# Patient Record
Sex: MALE | Race: BLACK OR AFRICAN AMERICAN | NOT HISPANIC OR LATINO | URBAN - METROPOLITAN AREA
[De-identification: names, ages, dates, MRNs, and addresses within clinical notes are randomized per-mention and may not be internally consistent; named-entity substitution may affect disease eponyms.]

---

## 2024-05-29 ENCOUNTER — NURSING HOME VISIT (OUTPATIENT)
Dept: POST ACUTE CARE | Facility: EXTERNAL LOCATION | Age: 84
End: 2024-05-29
Payer: MEDICARE

## 2024-05-29 DIAGNOSIS — J44.9 CHRONIC OBSTRUCTIVE PULMONARY DISEASE, UNSPECIFIED COPD TYPE (MULTI): ICD-10-CM

## 2024-05-29 DIAGNOSIS — A41.9 SEPSIS, DUE TO UNSPECIFIED ORGANISM, UNSPECIFIED WHETHER ACUTE ORGAN DYSFUNCTION PRESENT (MULTI): Primary | ICD-10-CM

## 2024-05-29 DIAGNOSIS — I11.0 HYPERTENSIVE HEART DISEASE WITH CONGESTIVE HEART FAILURE, UNSPECIFIED HEART FAILURE TYPE (MULTI): ICD-10-CM

## 2024-05-29 DIAGNOSIS — R33.9 URINARY RETENTION: ICD-10-CM

## 2024-05-29 DIAGNOSIS — I48.91 ATRIAL FIBRILLATION, UNSPECIFIED TYPE (MULTI): ICD-10-CM

## 2024-05-29 PROBLEM — N40.0 BPH (BENIGN PROSTATIC HYPERPLASIA): Status: RESOLVED | Noted: 2024-05-29 | Resolved: 2024-05-29

## 2024-05-29 PROBLEM — K21.9 GERD (GASTROESOPHAGEAL REFLUX DISEASE): Status: ACTIVE | Noted: 2024-05-29

## 2024-05-29 PROBLEM — E11.9 TYPE 2 DIABETES MELLITUS (MULTI): Status: ACTIVE | Noted: 2024-05-29

## 2024-05-29 PROBLEM — N40.0 BPH (BENIGN PROSTATIC HYPERPLASIA): Status: ACTIVE | Noted: 2024-05-29

## 2024-05-29 PROCEDURE — 99305 1ST NF CARE MODERATE MDM 35: CPT | Performed by: INTERNAL MEDICINE

## 2024-05-29 NOTE — LETTER
Patient: Kael Barber  : 1940    Encounter Date: 2024    HISTORY & PHYSICAL    Subjective  Chief complaint: Kael Barber is a 84 y.o. male who is being seen and evaluated for multiple medical problems.  Patient admitted to SNF for therapy due to weakness after recent hospitalization.    HPI:  HPI  Patient was admitted to hospital for positive UTI, consistent with sepsis.  Patient was started on antibiotics.  Patient had recent hospitalization for A-fib flutter, underwent cardioversion which was unsuccessful and ultimately underwent AV josie ablation, pacemaker placement.  Patient had an previous hospitalization urinary retention and was discharged with Angel in place.  Patient to continue antibiotics outpatient.  Patient is on antibiotics and is tolerating without adverse effects.  Cardiology did consult on patient and had an ICD check without events.  Patient was hemodynamically stable to discharge to SNF for continued medical management and therapy.  Patient was seen and examined at the bedside, appears to be in no acute distress.  Denies chest pain or shortness of breath.  Denies fever or chills.  Past Medical History:   Diagnosis Date   • Atrial fibrillation (Multi)    • CHF (congestive heart failure) (Multi)    • COPD (chronic obstructive pulmonary disease) (Multi)    • GERD (gastroesophageal reflux disease)    • Hypertension    • Hypotension        No past surgical history on file.    Family History   Problem Relation Name Age of Onset   • No Known Problems Mother     • No Known Problems Father         Social History     Socioeconomic History   • Marital status: Single     Spouse name: Not on file   • Number of children: Not on file   • Years of education: Not on file   • Highest education level: Not on file   Occupational History   • Not on file   Tobacco Use   • Smoking status: Not on file   • Smokeless tobacco: Not on file   Substance and Sexual Activity   • Alcohol use: Not on file   • Drug use:  Not on file   • Sexual activity: Not on file   Other Topics Concern   • Not on file   Social History Narrative   • Not on file     Social Determinants of Health     Financial Resource Strain: Not on file   Food Insecurity: Not on file   Transportation Needs: Not on file   Physical Activity: Not on file   Stress: Not on file   Social Connections: Not on file   Intimate Partner Violence: Not on file   Housing Stability: Not on file       Vital signs: 127/84, 86, 18, 97.6, 97%    Objective  Physical Exam  Constitutional:       General: He is not in acute distress.  Eyes:      Extraocular Movements: Extraocular movements intact.   Cardiovascular:      Rate and Rhythm: Normal rate and regular rhythm.   Pulmonary:      Effort: Pulmonary effort is normal.      Breath sounds: Normal breath sounds.   Abdominal:      General: Bowel sounds are normal.      Palpations: Abdomen is soft.   Musculoskeletal:      Cervical back: Neck supple.      Right lower leg: No edema.      Left lower leg: No edema.   Neurological:      Mental Status: He is alert.   Psychiatric:         Mood and Affect: Mood normal.         Behavior: Behavior is cooperative.         Assessment/Plan  Problem List Items Addressed This Visit       Atrial fibrillation (Multi)     Recent AV josie ablation and pacemaker placement on previous hospitalization  Eliquis  Bleeding precautions         COPD (chronic obstructive pulmonary disease) (Multi)     Monitor for shortness of breath  Elevate head of bed  Inhaler         Hypertensive heart disease with CHF (congestive heart failure) (Multi)     Monitor blood pressure  Spironolactone   CHF: Monitor for shortness of breath, weight gain           Sepsis (Multi) - Primary     Urinary source  Continue antibiotics ciprofloxacin until complete.  5/31/2024         Urinary retention     Tamsulosin  Monitor symptoms          Hospital records reviewed  Medications, treatments, and labs reviewed  Continue medications and  treatments as listed in EMR  Discussed with nursing and therapy      Scribe Attestation  I, Uriel Bowen   attest that this documentation has been prepared under the direction and in the presence of Ray Aleman MD    Provider Attestation - Scribe documentation  All medical record entries made by the Scribe were at my direction and personally dictated by me. I have reviewed the chart and agree that the record accurately reflects my personal performance of the history, physical exam, discussion and plan.   Ray Aleman MD      Electronically Signed By: Ray Aleman MD   5/29/24  5:43 PM

## 2024-05-29 NOTE — PROGRESS NOTES
HISTORY & PHYSICAL    Subjective   Chief complaint: Kael Barber is a 84 y.o. male who is being seen and evaluated for multiple medical problems.  Patient admitted to SNF for therapy due to weakness after recent hospitalization.    HPI:  HPI  Patient was admitted to hospital for positive UTI, consistent with sepsis.  Patient was started on antibiotics.  Patient had recent hospitalization for A-fib flutter, underwent cardioversion which was unsuccessful and ultimately underwent AV josie ablation, pacemaker placement.  Patient had an previous hospitalization urinary retention and was discharged with Angel in place.  Patient to continue antibiotics outpatient.  Patient is on antibiotics and is tolerating without adverse effects.  Cardiology did consult on patient and had an ICD check without events.  Patient was hemodynamically stable to discharge to SNF for continued medical management and therapy.  Patient was seen and examined at the bedside, appears to be in no acute distress.  Denies chest pain or shortness of breath.  Denies fever or chills.  Past Medical History:   Diagnosis Date    Atrial fibrillation (Multi)     CHF (congestive heart failure) (Multi)     COPD (chronic obstructive pulmonary disease) (Multi)     GERD (gastroesophageal reflux disease)     Hypertension     Hypotension        No past surgical history on file.    Family History   Problem Relation Name Age of Onset    No Known Problems Mother      No Known Problems Father         Social History     Socioeconomic History    Marital status: Single     Spouse name: Not on file    Number of children: Not on file    Years of education: Not on file    Highest education level: Not on file   Occupational History    Not on file   Tobacco Use    Smoking status: Not on file    Smokeless tobacco: Not on file   Substance and Sexual Activity    Alcohol use: Not on file    Drug use: Not on file    Sexual activity: Not on file   Other Topics Concern    Not on file    Social History Narrative    Not on file     Social Determinants of Health     Financial Resource Strain: Not on file   Food Insecurity: Not on file   Transportation Needs: Not on file   Physical Activity: Not on file   Stress: Not on file   Social Connections: Not on file   Intimate Partner Violence: Not on file   Housing Stability: Not on file       Vital signs: 127/84, 86, 18, 97.6, 97%    Objective   Physical Exam  Constitutional:       General: He is not in acute distress.  Eyes:      Extraocular Movements: Extraocular movements intact.   Cardiovascular:      Rate and Rhythm: Normal rate and regular rhythm.   Pulmonary:      Effort: Pulmonary effort is normal.      Breath sounds: Normal breath sounds.   Abdominal:      General: Bowel sounds are normal.      Palpations: Abdomen is soft.   Musculoskeletal:      Cervical back: Neck supple.      Right lower leg: No edema.      Left lower leg: No edema.   Neurological:      Mental Status: He is alert.   Psychiatric:         Mood and Affect: Mood normal.         Behavior: Behavior is cooperative.         Assessment/Plan   Problem List Items Addressed This Visit       Atrial fibrillation (Multi)     Recent AV josie ablation and pacemaker placement on previous hospitalization  Eliquis  Bleeding precautions         COPD (chronic obstructive pulmonary disease) (Multi)     Monitor for shortness of breath  Elevate head of bed  Inhaler         Hypertensive heart disease with CHF (congestive heart failure) (Multi)     Monitor blood pressure  Spironolactone   CHF: Monitor for shortness of breath, weight gain           Sepsis (Multi) - Primary     Urinary source  Continue antibiotics ciprofloxacin until complete.  5/31/2024         Urinary retention     Tamsulosin  Monitor symptoms          Hospital records reviewed  Medications, treatments, and labs reviewed  Continue medications and treatments as listed in EMR  Discussed with nursing and therapy      Uriel Garciaation  I,  Víctor Bowenibe   attest that this documentation has been prepared under the direction and in the presence of Ray Aleman MD    Provider Attestation - Scribe documentation  All medical record entries made by the Scribe were at my direction and personally dictated by me. I have reviewed the chart and agree that the record accurately reflects my personal performance of the history, physical exam, discussion and plan.   Ray Aleman MD

## 2024-05-29 NOTE — ASSESSMENT & PLAN NOTE
Recent AV josie ablation and pacemaker placement on previous hospitalization  Eliquis  Bleeding precautions

## 2024-05-31 ENCOUNTER — NURSING HOME VISIT (OUTPATIENT)
Dept: POST ACUTE CARE | Facility: EXTERNAL LOCATION | Age: 84
End: 2024-05-31
Payer: MEDICARE

## 2024-05-31 DIAGNOSIS — J44.9 CHRONIC OBSTRUCTIVE PULMONARY DISEASE, UNSPECIFIED COPD TYPE (MULTI): ICD-10-CM

## 2024-05-31 DIAGNOSIS — R53.1 WEAKNESS: Primary | ICD-10-CM

## 2024-05-31 DIAGNOSIS — I48.91 ATRIAL FIBRILLATION, UNSPECIFIED TYPE (MULTI): ICD-10-CM

## 2024-05-31 DIAGNOSIS — I11.0 HYPERTENSIVE HEART DISEASE WITH CONGESTIVE HEART FAILURE, UNSPECIFIED HEART FAILURE TYPE (MULTI): ICD-10-CM

## 2024-05-31 PROCEDURE — 99308 SBSQ NF CARE LOW MDM 20: CPT | Performed by: INTERNAL MEDICINE

## 2024-05-31 NOTE — LETTER
Patient: Kael Barber  : 1940    Encounter Date: 2024    PROGRESS NOTE    Subjective  Chief complaint: Kael Barber is a 84 y.o. male who is an acute skilled patient being seen and evaluated for weakness    HPI:  HPI  Patient does continue to work in therapy.  Patient seen and examined at bedside.  Therapy has been working with the patient to improve strength, endurance, ADLs, and transfers d/t generalized weakness.  Patient has no acute concerns today.  Patient denies chest pain or shortness of breath.  Denies nausea or vomiting.  Patient reportedly had a fall, no apparent injury.  Denies pain.    Objective  Vital signs: 127/84, 86, 18, 97.6, 97%    Physical Exam  Constitutional:       General: He is not in acute distress.  Eyes:      Extraocular Movements: Extraocular movements intact.   Cardiovascular:      Rate and Rhythm: Normal rate and regular rhythm.   Pulmonary:      Effort: Pulmonary effort is normal.      Breath sounds: Normal breath sounds.   Abdominal:      General: Bowel sounds are normal.      Palpations: Abdomen is soft.   Musculoskeletal:      Cervical back: Neck supple.      Right lower leg: No edema.      Left lower leg: No edema.   Neurological:      Mental Status: He is alert.      Motor: Weakness present.   Psychiatric:         Mood and Affect: Mood normal.         Behavior: Behavior is cooperative.         Assessment/Plan  Problem List Items Addressed This Visit       Atrial fibrillation (Multi)     Recent AV josie ablation and pacemaker placement on previous hospitalization  Eliquis  Bleeding precautions         COPD (chronic obstructive pulmonary disease) (Multi)     Monitor for shortness of breath  Elevate head of bed  Inhaler         Hypertensive heart disease with CHF (congestive heart failure) (Multi)     Monitor blood pressure  Spironolactone   CHF: Monitor for shortness of breath, weight gain           Weakness - Primary     Continue to work in therapy          Medications,  treatments, and labs reviewed  Continue medications and treatments as listed in EMR      Scribe Attestation  I, Uriel Bowen   attest that this documentation has been prepared under the direction and in the presence of Ray Aleman MD    Provider Attestation - Scribe documentation  All medical record entries made by the Scribe were at my direction and personally dictated by me. I have reviewed the chart and agree that the record accurately reflects my personal performance of the history, physical exam, discussion and plan.   Ray Aleman MD        Electronically Signed By: Ray Aleman MD   5/31/24  5:05 PM

## 2024-05-31 NOTE — PROGRESS NOTES
PROGRESS NOTE    Subjective   Chief complaint: Kael Barber is a 84 y.o. male who is an acute skilled patient being seen and evaluated for weakness    HPI:  HPI  Patient does continue to work in therapy.  Patient seen and examined at bedside.  Therapy has been working with the patient to improve strength, endurance, ADLs, and transfers d/t generalized weakness.  Patient has no acute concerns today.  Patient denies chest pain or shortness of breath.  Denies nausea or vomiting.  Patient reportedly had a fall, no apparent injury.  Denies pain.    Objective   Vital signs: 127/84, 86, 18, 97.6, 97%    Physical Exam  Constitutional:       General: He is not in acute distress.  Eyes:      Extraocular Movements: Extraocular movements intact.   Cardiovascular:      Rate and Rhythm: Normal rate and regular rhythm.   Pulmonary:      Effort: Pulmonary effort is normal.      Breath sounds: Normal breath sounds.   Abdominal:      General: Bowel sounds are normal.      Palpations: Abdomen is soft.   Musculoskeletal:      Cervical back: Neck supple.      Right lower leg: No edema.      Left lower leg: No edema.   Neurological:      Mental Status: He is alert.      Motor: Weakness present.   Psychiatric:         Mood and Affect: Mood normal.         Behavior: Behavior is cooperative.         Assessment/Plan   Problem List Items Addressed This Visit       Atrial fibrillation (Multi)     Recent AV josie ablation and pacemaker placement on previous hospitalization  Eliquis  Bleeding precautions         COPD (chronic obstructive pulmonary disease) (Multi)     Monitor for shortness of breath  Elevate head of bed  Inhaler         Hypertensive heart disease with CHF (congestive heart failure) (Multi)     Monitor blood pressure  Spironolactone   CHF: Monitor for shortness of breath, weight gain           Weakness - Primary     Continue to work in therapy          Medications, treatments, and labs reviewed  Continue medications and treatments  as listed in EMR      Scribe Attestation  I, Uriel Bowen   attest that this documentation has been prepared under the direction and in the presence of Ray Aleman MD    Provider Attestation - Scribe documentation  All medical record entries made by the Scribe were at my direction and personally dictated by me. I have reviewed the chart and agree that the record accurately reflects my personal performance of the history, physical exam, discussion and plan.   Ray Aleman MD

## 2024-06-04 ENCOUNTER — NURSING HOME VISIT (OUTPATIENT)
Dept: POST ACUTE CARE | Facility: EXTERNAL LOCATION | Age: 84
End: 2024-06-04
Payer: MEDICARE

## 2024-06-04 DIAGNOSIS — R33.9 URINARY RETENTION: ICD-10-CM

## 2024-06-04 DIAGNOSIS — I48.91 ATRIAL FIBRILLATION, UNSPECIFIED TYPE (MULTI): ICD-10-CM

## 2024-06-04 DIAGNOSIS — J44.9 CHRONIC OBSTRUCTIVE PULMONARY DISEASE, UNSPECIFIED COPD TYPE (MULTI): ICD-10-CM

## 2024-06-04 DIAGNOSIS — K21.00 GASTROESOPHAGEAL REFLUX DISEASE WITH ESOPHAGITIS, UNSPECIFIED WHETHER HEMORRHAGE: ICD-10-CM

## 2024-06-04 DIAGNOSIS — E11.69 TYPE 2 DIABETES MELLITUS WITH OTHER SPECIFIED COMPLICATION, UNSPECIFIED WHETHER LONG TERM INSULIN USE (MULTI): ICD-10-CM

## 2024-06-04 DIAGNOSIS — I11.0 HYPERTENSIVE HEART DISEASE WITH CONGESTIVE HEART FAILURE, UNSPECIFIED HEART FAILURE TYPE (MULTI): ICD-10-CM

## 2024-06-04 DIAGNOSIS — R53.1 WEAKNESS: Primary | ICD-10-CM

## 2024-06-04 PROCEDURE — 99309 SBSQ NF CARE MODERATE MDM 30: CPT | Performed by: NURSE PRACTITIONER

## 2024-06-04 NOTE — PROGRESS NOTES
PROGRESS NOTE    Subjective   Chief complaint: Kael Barber is a 84 y.o. male who is an acute skilled patient being seen and evaluated for weakness    HPI: Remains in therapy.  Caregiver reports that he requires extensive assistance with all hands-on care and and mobility.  He is in bed, just finished breakfast.  Reports he has a good appetite.  Denies any gastric distress.  Denies any chest pain or tightness, appears comfortable  HPI      Objective   Vital signs: 122/68-82-18-97.4    Physical Exam  Constitutional:       General: He is not in acute distress.  Eyes:      Extraocular Movements: Extraocular movements intact.   Cardiovascular:      Rate and Rhythm: Normal rate and regular rhythm.   Pulmonary:      Effort: Pulmonary effort is normal.      Breath sounds: Normal breath sounds.      Comments: Lungs clear   Abdominal:      General: Bowel sounds are normal.      Palpations: Abdomen is soft.   Musculoskeletal:      Cervical back: Neck supple.      Right lower leg: No edema.      Left lower leg: No edema.   Neurological:      Mental Status: He is alert.   Psychiatric:         Mood and Affect: Mood normal.         Behavior: Behavior is cooperative.         Assessment/Plan   Problem List Items Addressed This Visit       Atrial fibrillation (Multi)     Recent AV josie ablation and pacemaker placement on previous hospitalization  Eliquis  Bleeding precautions         COPD (chronic obstructive pulmonary disease) (Multi)     Monitor for shortness of breath  Elevate head of bed  Inhaler         GERD (gastroesophageal reflux disease)     PPI  Monitor GI symptoms         Hypertensive heart disease with CHF (congestive heart failure) (Multi)    Urinary retention     Tamsulosin  Monitor symptoms         Type 2 diabetes mellitus (Multi)     Monitor fasting blood sugar  dapagliflozin propanediol         Weakness - Primary     Continue in therapy          Medications, treatments, and labs reviewed  Continue medications and  treatments as listed in EMR    Luz Hicks, APRN-CNP

## 2024-06-04 NOTE — LETTER
Patient: Kael Barber  : 1940    Encounter Date: 2024    PROGRESS NOTE    Subjective  Chief complaint: Kael Barber is a 84 y.o. male who is an acute skilled patient being seen and evaluated for weakness    HPI: Remains in therapy.  Caregiver reports that he requires extensive assistance with all hands-on care and and mobility.  He is in bed, just finished breakfast.  Reports he has a good appetite.  Denies any gastric distress.  Denies any chest pain or tightness, appears comfortable  HPI      Objective  Vital signs: 122/68-82-18-97.4    Physical Exam  Constitutional:       General: He is not in acute distress.  Eyes:      Extraocular Movements: Extraocular movements intact.   Cardiovascular:      Rate and Rhythm: Normal rate and regular rhythm.   Pulmonary:      Effort: Pulmonary effort is normal.      Breath sounds: Normal breath sounds.      Comments: Lungs clear   Abdominal:      General: Bowel sounds are normal.      Palpations: Abdomen is soft.   Musculoskeletal:      Cervical back: Neck supple.      Right lower leg: No edema.      Left lower leg: No edema.   Neurological:      Mental Status: He is alert.   Psychiatric:         Mood and Affect: Mood normal.         Behavior: Behavior is cooperative.         Assessment/Plan  Problem List Items Addressed This Visit       Atrial fibrillation (Multi)     Recent AV josie ablation and pacemaker placement on previous hospitalization  Eliquis  Bleeding precautions         COPD (chronic obstructive pulmonary disease) (Multi)     Monitor for shortness of breath  Elevate head of bed  Inhaler         GERD (gastroesophageal reflux disease)     PPI  Monitor GI symptoms         Hypertensive heart disease with CHF (congestive heart failure) (Multi)    Urinary retention     Tamsulosin  Monitor symptoms         Type 2 diabetes mellitus (Multi)     Monitor fasting blood sugar  dapagliflozin propanediol         Weakness - Primary     Continue in therapy           Medications, treatments, and labs reviewed  Continue medications and treatments as listed in EMR    GILMA So      Electronically Signed By: GILMA So   6/8/24 12:28 PM

## 2024-06-05 ENCOUNTER — NURSING HOME VISIT (OUTPATIENT)
Dept: POST ACUTE CARE | Facility: EXTERNAL LOCATION | Age: 84
End: 2024-06-05
Payer: MEDICARE

## 2024-06-05 DIAGNOSIS — I48.91 ATRIAL FIBRILLATION, UNSPECIFIED TYPE (MULTI): ICD-10-CM

## 2024-06-05 DIAGNOSIS — I11.0 HYPERTENSIVE HEART DISEASE WITH CONGESTIVE HEART FAILURE, UNSPECIFIED HEART FAILURE TYPE (MULTI): ICD-10-CM

## 2024-06-05 DIAGNOSIS — R53.1 WEAKNESS: Primary | ICD-10-CM

## 2024-06-05 DIAGNOSIS — J44.9 CHRONIC OBSTRUCTIVE PULMONARY DISEASE, UNSPECIFIED COPD TYPE (MULTI): ICD-10-CM

## 2024-06-05 PROCEDURE — 99308 SBSQ NF CARE LOW MDM 20: CPT | Performed by: INTERNAL MEDICINE

## 2024-06-05 NOTE — LETTER
Patient: Kael Barber  : 1940    Encounter Date: 2024    PROGRESS NOTE    Subjective  Chief complaint: Kael Barber is a 84 y.o. male who is an acute skilled patient being seen and evaluated for weakness    HPI:  HPI  Therapy has been working with the patient to improve strength and endurance with ADLs, transfers, and mobility.  Patient continues to work toward goals.  Patient was seen and examined at the bedside, appears to be in no acute distress.  Patient is stable and has no new complaints.  Patient denies chest pain or shortness of breath.  Denies nausea or vomiting.  Nursing staff voices no new concerns today.    Objective  Vital signs: 127/84, 86, 18, 97.6, 97%    Physical Exam  Constitutional:       General: He is not in acute distress.  Eyes:      Extraocular Movements: Extraocular movements intact.   Cardiovascular:      Rate and Rhythm: Normal rate and regular rhythm.   Pulmonary:      Effort: Pulmonary effort is normal.      Breath sounds: Normal breath sounds.   Abdominal:      General: Bowel sounds are normal.      Palpations: Abdomen is soft.   Musculoskeletal:      Cervical back: Neck supple.      Right lower leg: No edema.      Left lower leg: No edema.   Neurological:      Mental Status: He is alert.      Motor: Weakness present.   Psychiatric:         Mood and Affect: Mood normal.         Behavior: Behavior is cooperative.         Assessment/Plan  Problem List Items Addressed This Visit       Atrial fibrillation (Multi)     Recent AV josie ablation and pacemaker placement on previous hospitalization  Eliquis  Bleeding precautions         COPD (chronic obstructive pulmonary disease) (Multi)     Monitor for shortness of breath  Elevate head of bed  Inhaler         Hypertensive heart disease with CHF (congestive heart failure) (Multi)     Monitor blood pressure  Spironolactone   CHF: Monitor for shortness of breath, weight gain           Weakness - Primary     Continue to work in therapy           Medications, treatments, and labs reviewed  Continue medications and treatments as listed in EMR      Scribe Attestation  I, Uriel Bowen   attest that this documentation has been prepared under the direction and in the presence of Ray Aleman MD    Provider Attestation - Scribe documentation  All medical record entries made by the Scribe were at my direction and personally dictated by me. I have reviewed the chart and agree that the record accurately reflects my personal performance of the history, physical exam, discussion and plan.   Ray Aleman MD        Electronically Signed By: Ray Aleman MD   6/5/24  2:21 PM

## 2024-06-05 NOTE — PROGRESS NOTES
PROGRESS NOTE    Subjective   Chief complaint: Kael Barber is a 84 y.o. male who is an acute skilled patient being seen and evaluated for weakness    HPI:  HPI  Therapy has been working with the patient to improve strength and endurance with ADLs, transfers, and mobility.  Patient continues to work toward goals.  Patient was seen and examined at the bedside, appears to be in no acute distress.  Patient is stable and has no new complaints.  Patient denies chest pain or shortness of breath.  Denies nausea or vomiting.  Nursing staff voices no new concerns today.    Objective   Vital signs: 127/84, 86, 18, 97.6, 97%    Physical Exam  Constitutional:       General: He is not in acute distress.  Eyes:      Extraocular Movements: Extraocular movements intact.   Cardiovascular:      Rate and Rhythm: Normal rate and regular rhythm.   Pulmonary:      Effort: Pulmonary effort is normal.      Breath sounds: Normal breath sounds.   Abdominal:      General: Bowel sounds are normal.      Palpations: Abdomen is soft.   Musculoskeletal:      Cervical back: Neck supple.      Right lower leg: No edema.      Left lower leg: No edema.   Neurological:      Mental Status: He is alert.      Motor: Weakness present.   Psychiatric:         Mood and Affect: Mood normal.         Behavior: Behavior is cooperative.         Assessment/Plan   Problem List Items Addressed This Visit       Atrial fibrillation (Multi)     Recent AV josie ablation and pacemaker placement on previous hospitalization  Eliquis  Bleeding precautions         COPD (chronic obstructive pulmonary disease) (Multi)     Monitor for shortness of breath  Elevate head of bed  Inhaler         Hypertensive heart disease with CHF (congestive heart failure) (Multi)     Monitor blood pressure  Spironolactone   CHF: Monitor for shortness of breath, weight gain           Weakness - Primary     Continue to work in therapy          Medications, treatments, and labs reviewed  Continue  medications and treatments as listed in EMR      Scribe Attestation  I, Uriel Bowen   attest that this documentation has been prepared under the direction and in the presence of Ray Aleman MD    Provider Attestation - Scribe documentation  All medical record entries made by the Scribe were at my direction and personally dictated by me. I have reviewed the chart and agree that the record accurately reflects my personal performance of the history, physical exam, discussion and plan.   Ray Aleman MD

## 2024-06-07 ENCOUNTER — NURSING HOME VISIT (OUTPATIENT)
Dept: POST ACUTE CARE | Facility: EXTERNAL LOCATION | Age: 84
End: 2024-06-07
Payer: MEDICARE

## 2024-06-07 DIAGNOSIS — I48.91 ATRIAL FIBRILLATION, UNSPECIFIED TYPE (MULTI): ICD-10-CM

## 2024-06-07 DIAGNOSIS — I11.0 HYPERTENSIVE HEART DISEASE WITH CONGESTIVE HEART FAILURE, UNSPECIFIED HEART FAILURE TYPE (MULTI): Primary | ICD-10-CM

## 2024-06-07 DIAGNOSIS — R53.1 WEAKNESS: ICD-10-CM

## 2024-06-07 DIAGNOSIS — J44.9 CHRONIC OBSTRUCTIVE PULMONARY DISEASE, UNSPECIFIED COPD TYPE (MULTI): ICD-10-CM

## 2024-06-07 PROCEDURE — 99309 SBSQ NF CARE MODERATE MDM 30: CPT | Performed by: INTERNAL MEDICINE

## 2024-06-07 NOTE — LETTER
Patient: Kael Barber  : 1940    Encounter Date: 2024    PROGRESS NOTE    Subjective  Chief complaint: Kael Barber is a 84 y.o. male who is an acute skilled patient being seen and evaluated for weakness    HPI:  HPI  Therapy has been working with the patient to improve strength and endurance with ADLs, transfers, and mobility.  Patient continues to work toward goals.  Patient was seen and examined at bedside, appears to be in no acute distress.  Patient denies nausea vomiting fever chills.  Patient is stable and has no new complaints.  Nursing staff voices no new concerns today.    Objective  Vital signs: 127/84, 86, 18, 97.6, 97%    Physical Exam  Constitutional:       General: He is not in acute distress.  Eyes:      Extraocular Movements: Extraocular movements intact.   Cardiovascular:      Rate and Rhythm: Normal rate and regular rhythm.   Pulmonary:      Effort: Pulmonary effort is normal.      Breath sounds: Normal breath sounds.   Abdominal:      General: Bowel sounds are normal.      Palpations: Abdomen is soft.   Musculoskeletal:      Cervical back: Neck supple.      Right lower leg: No edema.      Left lower leg: No edema.   Neurological:      Mental Status: He is alert.      Motor: Weakness present.   Psychiatric:         Mood and Affect: Mood normal.         Behavior: Behavior is cooperative.         Assessment/Plan  Problem List Items Addressed This Visit       Atrial fibrillation (Multi)     Recent AV josie ablation and pacemaker placement on previous hospitalization  Eliquis  Bleeding precautions         COPD (chronic obstructive pulmonary disease) (Multi)     Monitor for shortness of breath  Elevate head of bed  Inhaler         Hypertensive heart disease with CHF (congestive heart failure) (Multi) - Primary     Monitor blood pressure  Spironolactone   CHF: Monitor for shortness of breath, weight gain           Weakness     Continue in therapy          Medications, treatments, and labs  reviewed  Continue medications and treatments as listed in EMR      Scribe Attestation  I, Uriel Bowen   attest that this documentation has been prepared under the direction and in the presence of Ray Aleman MD    Provider Attestation - Scribe documentation  All medical record entries made by the Scribe were at my direction and personally dictated by me. I have reviewed the chart and agree that the record accurately reflects my personal performance of the history, physical exam, discussion and plan.   Ray Aleman MD        Electronically Signed By: Ray Aleman MD   6/10/24 11:16 AM

## 2024-06-07 NOTE — PROGRESS NOTES
PROGRESS NOTE    Subjective   Chief complaint: Kael Barber is a 84 y.o. male who is an acute skilled patient being seen and evaluated for weakness    HPI:  HPI  Therapy has been working with the patient to improve strength and endurance with ADLs, transfers, and mobility.  Patient continues to work toward goals.  Patient was seen and examined at bedside, appears to be in no acute distress.  Patient denies nausea vomiting fever chills.  Patient is stable and has no new complaints.  Nursing staff voices no new concerns today.    Objective   Vital signs: 127/84, 86, 18, 97.6, 97%    Physical Exam  Constitutional:       General: He is not in acute distress.  Eyes:      Extraocular Movements: Extraocular movements intact.   Cardiovascular:      Rate and Rhythm: Normal rate and regular rhythm.   Pulmonary:      Effort: Pulmonary effort is normal.      Breath sounds: Normal breath sounds.   Abdominal:      General: Bowel sounds are normal.      Palpations: Abdomen is soft.   Musculoskeletal:      Cervical back: Neck supple.      Right lower leg: No edema.      Left lower leg: No edema.   Neurological:      Mental Status: He is alert.      Motor: Weakness present.   Psychiatric:         Mood and Affect: Mood normal.         Behavior: Behavior is cooperative.         Assessment/Plan   Problem List Items Addressed This Visit       Atrial fibrillation (Multi)     Recent AV josie ablation and pacemaker placement on previous hospitalization  Eliquis  Bleeding precautions         COPD (chronic obstructive pulmonary disease) (Multi)     Monitor for shortness of breath  Elevate head of bed  Inhaler         Hypertensive heart disease with CHF (congestive heart failure) (Multi) - Primary     Monitor blood pressure  Spironolactone   CHF: Monitor for shortness of breath, weight gain           Weakness     Continue in therapy          Medications, treatments, and labs reviewed  Continue medications and treatments as listed in  EMR      Scribe Attestation  I, Uriel Bowen   attest that this documentation has been prepared under the direction and in the presence of Ray Aleman MD    Provider Attestation - Scribe documentation  All medical record entries made by the Scribe were at my direction and personally dictated by me. I have reviewed the chart and agree that the record accurately reflects my personal performance of the history, physical exam, discussion and plan.   Ray Aleman MD

## 2024-06-08 PROBLEM — A41.9 SEPSIS (MULTI): Status: RESOLVED | Noted: 2024-05-29 | Resolved: 2024-06-08

## 2024-07-09 ENCOUNTER — NURSING HOME VISIT (OUTPATIENT)
Dept: POST ACUTE CARE | Facility: EXTERNAL LOCATION | Age: 84
End: 2024-07-09
Payer: MEDICARE

## 2024-07-09 DIAGNOSIS — I48.91 ATRIAL FIBRILLATION, UNSPECIFIED TYPE (MULTI): ICD-10-CM

## 2024-07-09 DIAGNOSIS — A49.8 INFECTION DUE TO ESBL-PRODUCING ESCHERICHIA COLI: ICD-10-CM

## 2024-07-09 DIAGNOSIS — I82.621 DEEP VEIN THROMBOSIS (DVT) OF RIGHT UPPER EXTREMITY, UNSPECIFIED CHRONICITY, UNSPECIFIED VEIN (MULTI): ICD-10-CM

## 2024-07-09 DIAGNOSIS — R53.1 WEAKNESS: Primary | ICD-10-CM

## 2024-07-09 DIAGNOSIS — N40.0 BENIGN PROSTATIC HYPERPLASIA, UNSPECIFIED WHETHER LOWER URINARY TRACT SYMPTOMS PRESENT: ICD-10-CM

## 2024-07-09 DIAGNOSIS — R33.9 URINARY RETENTION: ICD-10-CM

## 2024-07-09 DIAGNOSIS — J44.9 CHRONIC OBSTRUCTIVE PULMONARY DISEASE, UNSPECIFIED COPD TYPE (MULTI): ICD-10-CM

## 2024-07-09 DIAGNOSIS — E11.69 TYPE 2 DIABETES MELLITUS WITH OTHER SPECIFIED COMPLICATION, UNSPECIFIED WHETHER LONG TERM INSULIN USE (MULTI): ICD-10-CM

## 2024-07-09 DIAGNOSIS — Z16.12 INFECTION DUE TO ESBL-PRODUCING ESCHERICHIA COLI: ICD-10-CM

## 2024-07-09 DIAGNOSIS — I11.0 HYPERTENSIVE HEART DISEASE WITH CONGESTIVE HEART FAILURE, UNSPECIFIED HEART FAILURE TYPE (MULTI): ICD-10-CM

## 2024-07-09 DIAGNOSIS — N13.30 HYDRONEPHROSIS, UNSPECIFIED HYDRONEPHROSIS TYPE: ICD-10-CM

## 2024-07-09 PROCEDURE — 99309 SBSQ NF CARE MODERATE MDM 30: CPT | Performed by: NURSE PRACTITIONER

## 2024-07-09 NOTE — LETTER
Patient: Kael Barber  : 1940    Encounter Date: 2024    PROGRESS NOTE    Subjective  Chief complaint: Kael Barber is a 84 y.o. male who is an acute skilled patient being seen and evaluated for weakness    HPI: recently admitted to this facility for rehabilitation. Is pleasant and talkative.    Has left nephrostomy tube.  Nursing reports hematuria in in collection bag.   He reports that he suspected his nephrostomy tube was pulled during transfer.    Also has bruno catheter with CD bag. The bruno is draining pale yellow urine with sediment.    Nursing instructed to obtain urine C&S from nephrostomy tube.   Has been recently treated with IV antibiotics for acute UTI. Labs ordered.   HPI      Objective  Vital signs: 121/44-79-     Physical Exam  Vitals and nursing note reviewed.   Constitutional:       General: He is not in acute distress.  Eyes:      Extraocular Movements: Extraocular movements intact.   Cardiovascular:      Rate and Rhythm: Normal rate and regular rhythm.   Pulmonary:      Effort: Pulmonary effort is normal.      Breath sounds: Normal breath sounds.      Comments: Lungs clear   Abdominal:      General: Bowel sounds are normal.      Palpations: Abdomen is soft.   Genitourinary:     Comments: Left nephrostomy tube - drainage with hematuria Dressing is dry and intact  No drainage. No inflammation     Bruno catheter - CD bag- pale yellow urine   Musculoskeletal:      Cervical back: Neck supple.      Right lower leg: No edema.      Left lower leg: No edema.   Neurological:      Mental Status: He is alert.   Psychiatric:         Mood and Affect: Mood normal.         Behavior: Behavior is cooperative.         Assessment/Plan  Problem List Items Addressed This Visit       Atrial fibrillation (Multi)     Eliquis  Bleeding precautions  Metoprolol         COPD (chronic obstructive pulmonary disease) (Multi)     Monitor for shortness of breath  Elevate head of bed  Inhalers         Hypertensive  heart disease with CHF (congestive heart failure) (Multi)     Monitor blood pressure  Spironolactone   CHF: Monitor for shortness of breath, weight gain  Entresto  Metoprolol         BPH (benign prostatic hyperplasia)     Tamsulosin  Monitor for increased  symptoms         Urinary retention     Tamsulosin  Monitor symptoms         Type 2 diabetes mellitus (Multi)     Monitor fasting blood sugar         Weakness - Primary     Continue working in therapy         Infection due to ESBL-producing Escherichia coli     Completed antibiotics, remove PICC         Deep vein thrombosis, upper right extremity (Multi)     Remains on eliquis  Bleeding precautions         Hydronephrosis     Status post left nephrostomy tube placement.  F/u outpatient for stent exchange in 6 weeks  Hematuria evident - nephrostomy -to send urine culture   Stress need to prevent any trauma to nephrostomy tube           Medications, treatments, and labs reviewed  Continue medications and treatments as listed in EMR    GILMA So      Electronically Signed By: GILMA So   7/13/24 11:47 AM

## 2024-07-10 ENCOUNTER — NURSING HOME VISIT (OUTPATIENT)
Dept: POST ACUTE CARE | Facility: EXTERNAL LOCATION | Age: 84
End: 2024-07-10
Payer: MEDICARE

## 2024-07-10 DIAGNOSIS — J44.9 CHRONIC OBSTRUCTIVE PULMONARY DISEASE, UNSPECIFIED COPD TYPE (MULTI): ICD-10-CM

## 2024-07-10 DIAGNOSIS — N13.30 HYDRONEPHROSIS, UNSPECIFIED HYDRONEPHROSIS TYPE: ICD-10-CM

## 2024-07-10 DIAGNOSIS — N40.0 BENIGN PROSTATIC HYPERPLASIA, UNSPECIFIED WHETHER LOWER URINARY TRACT SYMPTOMS PRESENT: ICD-10-CM

## 2024-07-10 DIAGNOSIS — I82.621 DEEP VEIN THROMBOSIS (DVT) OF RIGHT UPPER EXTREMITY, UNSPECIFIED CHRONICITY, UNSPECIFIED VEIN (MULTI): ICD-10-CM

## 2024-07-10 DIAGNOSIS — Z16.12 INFECTION DUE TO ESBL-PRODUCING ESCHERICHIA COLI: Primary | ICD-10-CM

## 2024-07-10 DIAGNOSIS — E11.69 TYPE 2 DIABETES MELLITUS WITH OTHER SPECIFIED COMPLICATION, UNSPECIFIED WHETHER LONG TERM INSULIN USE (MULTI): ICD-10-CM

## 2024-07-10 DIAGNOSIS — I48.91 ATRIAL FIBRILLATION, UNSPECIFIED TYPE (MULTI): ICD-10-CM

## 2024-07-10 DIAGNOSIS — A49.8 INFECTION DUE TO ESBL-PRODUCING ESCHERICHIA COLI: Primary | ICD-10-CM

## 2024-07-10 DIAGNOSIS — K21.00 GASTROESOPHAGEAL REFLUX DISEASE WITH ESOPHAGITIS, UNSPECIFIED WHETHER HEMORRHAGE: ICD-10-CM

## 2024-07-10 DIAGNOSIS — I11.0 HYPERTENSIVE HEART DISEASE WITH CONGESTIVE HEART FAILURE, UNSPECIFIED HEART FAILURE TYPE (MULTI): ICD-10-CM

## 2024-07-10 PROCEDURE — 99305 1ST NF CARE MODERATE MDM 35: CPT | Performed by: INTERNAL MEDICINE

## 2024-07-10 NOTE — PROGRESS NOTES
HISTORY & PHYSICAL    Subjective   Chief complaint: Kael Barber is a 84 y.o. male who is being seen and evaluated for multiple medical problems.  Patient admitted to SNF for therapy due to weakness after recent hospitalization.    HPI:  HPI  Patient presents for admission to nursing facility following hospitalization for urosepsis, ESBL with obstruction.  Patient was started on antibiotics  And completed course in hospital.Patient did have left nephrostomy tube placed.  Patient's nephrostomy tube functioning appropriately.  Patient's hospital course was complicated by right upper extremity line associated DVT and patient loaded with Eliquis.  Patient has follow-up appointments with urology, cardiology and interventional radiology for nephrostomy tube exchange in 6 weeks.  PT OT evaluated patient with recommendations for SNF.  Patient was seen and examined at the bedside, appears to be in no acute distress.  Patient denies nausea vomiting fever chills.    Past Medical History:   Diagnosis Date    Atrial fibrillation (Multi)     CHF (congestive heart failure) (Multi)     COPD (chronic obstructive pulmonary disease) (Multi)     GERD (gastroesophageal reflux disease)     Hypertension     Hypotension        No past surgical history on file.    Family History   Problem Relation Name Age of Onset    No Known Problems Mother      No Known Problems Father         Social History     Socioeconomic History    Marital status: Single     Social Determinants of Health     Food Insecurity: No Food Insecurity (6/25/2024)    Received from [facility]    Hunger Vital Sign     Worried About Running Out of Food in the Last Year: Never true     Ran Out of Food in the Last Year: Never true   Transportation Needs: No Transportation Needs (6/25/2024)    Received from [facility]    PRAPARE - Transportation     Lack of Transportation (Medical): No     Lack of Transportation (Non-Medical): No   Housing Stability: Low Risk  (6/25/2024)     Received from [facility]    Housing Stability Vital Sign     Unable to Pay for Housing in the Last Year: No     Number of Places Lived in the Last Year: 1     In the last 12 months, was there a time when you did not have a steady place to sleep or slept in a shelter (including now)?: No       Vital signs: 104/63, 88, 18, 98%    Objective   Physical Exam  Constitutional:       General: He is not in acute distress.  Eyes:      Extraocular Movements: Extraocular movements intact.   Cardiovascular:      Rate and Rhythm: Normal rate and regular rhythm.   Pulmonary:      Effort: Pulmonary effort is normal.      Breath sounds: Normal breath sounds.   Abdominal:      General: Bowel sounds are normal.      Palpations: Abdomen is soft.   Genitourinary:     Comments: Left nephrostomy tube  Musculoskeletal:      Cervical back: Neck supple.      Right lower leg: No edema.      Left lower leg: No edema.   Neurological:      Mental Status: He is alert.   Psychiatric:         Mood and Affect: Mood normal.         Behavior: Behavior is cooperative.         Assessment/Plan   Problem List Items Addressed This Visit       Atrial fibrillation (Multi)     Eliquis  Bleeding precautions  Metoprolol         COPD (chronic obstructive pulmonary disease) (Multi)     Monitor for shortness of breath  Elevate head of bed  Inhalers         GERD (gastroesophageal reflux disease)     PPI  Monitor GI symptoms         Hypertensive heart disease with CHF (congestive heart failure) (Multi)     Monitor blood pressure  Spironolactone   CHF: Monitor for shortness of breath, weight gain  Entresto  Metoprolol         BPH (benign prostatic hyperplasia)     Tamsulosin  Monitor for increased  symptoms         Type 2 diabetes mellitus (Multi)     Monitor fasting blood sugar         Infection due to ESBL-producing Escherichia coli - Primary     Completed antibiotics         Deep vein thrombosis, upper right extremity (Multi)     Line Associated  Loaded  with Eliquis in hospital.  Remains on anticoagulant  Bleeding precautions         Hydronephrosis     Status post left nephrostomy tube placement.  F/u outpatient for stent exchange in 6 weeks          Hospital records reviewed  Medications, treatments, and labs reviewed  Continue medications and treatments as listed in EMR  Discussed with nursing and therapy      Scribe Attestation  I, Uriel Bowen   attest that this documentation has been prepared under the direction and in the presence of Ray Aleman MD    Provider Attestation - Scribe documentation  All medical record entries made by the Scribe were at my direction and personally dictated by me. I have reviewed the chart and agree that the record accurately reflects my personal performance of the history, physical exam, discussion and plan.   Ray Aleman MD

## 2024-07-10 NOTE — ASSESSMENT & PLAN NOTE
Monitor blood pressure  Spironolactone   CHF: Monitor for shortness of breath, weight gain  Entresto  Metoprolol

## 2024-07-10 NOTE — PROGRESS NOTES
PROGRESS NOTE    Subjective   Chief complaint: Kael Barber is a 84 y.o. male who is an acute skilled patient being seen and evaluated for weakness    HPI: recently admitted to this facility for rehabilitation. Is pleasant and talkative.    Has left nephrostomy tube.  Nursing reports hematuria in in collection bag.   He reports that he suspected his nephrostomy tube was pulled during transfer.    Also has bruno catheter with CD bag. The bruno is draining pale yellow urine with sediment.    Nursing instructed to obtain urine C&S from nephrostomy tube.   Has been recently treated with IV antibiotics for acute UTI. Labs ordered.   HPI      Objective   Vital signs: 121/62-27-     Physical Exam  Vitals and nursing note reviewed.   Constitutional:       General: He is not in acute distress.  Eyes:      Extraocular Movements: Extraocular movements intact.   Cardiovascular:      Rate and Rhythm: Normal rate and regular rhythm.   Pulmonary:      Effort: Pulmonary effort is normal.      Breath sounds: Normal breath sounds.      Comments: Lungs clear   Abdominal:      General: Bowel sounds are normal.      Palpations: Abdomen is soft.   Genitourinary:     Comments: Left nephrostomy tube - drainage with hematuria Dressing is dry and intact  No drainage. No inflammation     Bruno catheter - CD bag- pale yellow urine   Musculoskeletal:      Cervical back: Neck supple.      Right lower leg: No edema.      Left lower leg: No edema.   Neurological:      Mental Status: He is alert.   Psychiatric:         Mood and Affect: Mood normal.         Behavior: Behavior is cooperative.         Assessment/Plan   Problem List Items Addressed This Visit       Atrial fibrillation (Multi)     Eliquis  Bleeding precautions  Metoprolol         COPD (chronic obstructive pulmonary disease) (Multi)     Monitor for shortness of breath  Elevate head of bed  Inhalers         Hypertensive heart disease with CHF (congestive heart failure) (Multi)      Monitor blood pressure  Spironolactone   CHF: Monitor for shortness of breath, weight gain  Entresto  Metoprolol         BPH (benign prostatic hyperplasia)     Tamsulosin  Monitor for increased  symptoms         Urinary retention     Tamsulosin  Monitor symptoms         Type 2 diabetes mellitus (Multi)     Monitor fasting blood sugar         Weakness - Primary     Continue working in therapy         Infection due to ESBL-producing Escherichia coli     Completed antibiotics, remove PICC         Deep vein thrombosis, upper right extremity (Multi)     Remains on eliquis  Bleeding precautions         Hydronephrosis     Status post left nephrostomy tube placement.  F/u outpatient for stent exchange in 6 weeks  Hematuria evident - nephrostomy -to send urine culture   Stress need to prevent any trauma to nephrostomy tube           Medications, treatments, and labs reviewed  Continue medications and treatments as listed in EMR    Luz Hicks, APRN-CNP

## 2024-07-10 NOTE — LETTER
Patient: Kael Barber  : 1940    Encounter Date: 07/10/2024    HISTORY & PHYSICAL    Subjective  Chief complaint: Kael Barber is a 84 y.o. male who is being seen and evaluated for multiple medical problems.  Patient admitted to SNF for therapy due to weakness after recent hospitalization.    HPI:  HPI  Patient presents for admission to nursing facility following hospitalization for urosepsis, ESBL with obstruction.  Patient was started on antibiotics  And completed course in hospital.Patient did have left nephrostomy tube placed.  Patient's nephrostomy tube functioning appropriately.  Patient's hospital course was complicated by right upper extremity line associated DVT and patient loaded with Eliquis.  Patient has follow-up appointments with urology, cardiology and interventional radiology for nephrostomy tube exchange in 6 weeks.  PT OT evaluated patient with recommendations for SNF.  Patient was seen and examined at the bedside, appears to be in no acute distress.  Patient denies nausea vomiting fever chills.    Past Medical History:   Diagnosis Date   • Atrial fibrillation (Multi)    • CHF (congestive heart failure) (Multi)    • COPD (chronic obstructive pulmonary disease) (Multi)    • GERD (gastroesophageal reflux disease)    • Hypertension    • Hypotension        No past surgical history on file.    Family History   Problem Relation Name Age of Onset   • No Known Problems Mother     • No Known Problems Father         Social History     Socioeconomic History   • Marital status: Single     Social Determinants of Health     Food Insecurity: No Food Insecurity (2024)    Received from [facility]    Hunger Vital Sign    • Worried About Running Out of Food in the Last Year: Never true    • Ran Out of Food in the Last Year: Never true   Transportation Needs: No Transportation Needs (2024)    Received from [facility]    PRAPARE - Transportation    • Lack of Transportation (Medical): No    •  Lack of Transportation (Non-Medical): No   Housing Stability: Low Risk  (6/25/2024)    Received from [facility]    Housing Stability Vital Sign    • Unable to Pay for Housing in the Last Year: No    • Number of Places Lived in the Last Year: 1    • In the last 12 months, was there a time when you did not have a steady place to sleep or slept in a shelter (including now)?: No       Vital signs: 104/63, 88, 18, 98%    Objective  Physical Exam  Constitutional:       General: He is not in acute distress.  Eyes:      Extraocular Movements: Extraocular movements intact.   Cardiovascular:      Rate and Rhythm: Normal rate and regular rhythm.   Pulmonary:      Effort: Pulmonary effort is normal.      Breath sounds: Normal breath sounds.   Abdominal:      General: Bowel sounds are normal.      Palpations: Abdomen is soft.   Genitourinary:     Comments: Left nephrostomy tube  Musculoskeletal:      Cervical back: Neck supple.      Right lower leg: No edema.      Left lower leg: No edema.   Neurological:      Mental Status: He is alert.   Psychiatric:         Mood and Affect: Mood normal.         Behavior: Behavior is cooperative.         Assessment/Plan  Problem List Items Addressed This Visit       Atrial fibrillation (Multi)     Eliquis  Bleeding precautions  Metoprolol         COPD (chronic obstructive pulmonary disease) (Multi)     Monitor for shortness of breath  Elevate head of bed  Inhalers         GERD (gastroesophageal reflux disease)     PPI  Monitor GI symptoms         Hypertensive heart disease with CHF (congestive heart failure) (Multi)     Monitor blood pressure  Spironolactone   CHF: Monitor for shortness of breath, weight gain  Entresto  Metoprolol         BPH (benign prostatic hyperplasia)     Tamsulosin  Monitor for increased  symptoms         Type 2 diabetes mellitus (Multi)     Monitor fasting blood sugar         Infection due to ESBL-producing Escherichia coli - Primary     Completed antibiotics          Deep vein thrombosis, upper right extremity (Multi)     Line Associated  Loaded with Eliquis in hospital.  Remains on anticoagulant  Bleeding precautions         Hydronephrosis     Status post left nephrostomy tube placement.  F/u outpatient for stent exchange in 6 weeks          Hospital records reviewed  Medications, treatments, and labs reviewed  Continue medications and treatments as listed in EMR  Discussed with nursing and therapy      Scribe Attestation  Evie GONZALEZ Scribe   attest that this documentation has been prepared under the direction and in the presence of Ray Aleman MD    Provider Attestation - Scribe documentation  All medical record entries made by the Scribe were at my direction and personally dictated by me. I have reviewed the chart and agree that the record accurately reflects my personal performance of the history, physical exam, discussion and plan.   Ray Aleman MD      Electronically Signed By: Ray Aleman MD   7/11/24 11:33 AM

## 2024-07-11 ENCOUNTER — NURSING HOME VISIT (OUTPATIENT)
Dept: POST ACUTE CARE | Facility: EXTERNAL LOCATION | Age: 84
End: 2024-07-11
Payer: MEDICARE

## 2024-07-11 DIAGNOSIS — I11.0 HYPERTENSIVE HEART DISEASE WITH CONGESTIVE HEART FAILURE, UNSPECIFIED HEART FAILURE TYPE (MULTI): ICD-10-CM

## 2024-07-11 DIAGNOSIS — I48.91 ATRIAL FIBRILLATION, UNSPECIFIED TYPE (MULTI): Primary | ICD-10-CM

## 2024-07-11 DIAGNOSIS — K21.00 GASTROESOPHAGEAL REFLUX DISEASE WITH ESOPHAGITIS, UNSPECIFIED WHETHER HEMORRHAGE: ICD-10-CM

## 2024-07-11 DIAGNOSIS — N40.0 BENIGN PROSTATIC HYPERPLASIA, UNSPECIFIED WHETHER LOWER URINARY TRACT SYMPTOMS PRESENT: ICD-10-CM

## 2024-07-11 DIAGNOSIS — E11.69 TYPE 2 DIABETES MELLITUS WITH OTHER SPECIFIED COMPLICATION, UNSPECIFIED WHETHER LONG TERM INSULIN USE (MULTI): ICD-10-CM

## 2024-07-11 DIAGNOSIS — I82.621 DEEP VEIN THROMBOSIS (DVT) OF RIGHT UPPER EXTREMITY, UNSPECIFIED CHRONICITY, UNSPECIFIED VEIN (MULTI): ICD-10-CM

## 2024-07-11 DIAGNOSIS — N13.30 HYDRONEPHROSIS, UNSPECIFIED HYDRONEPHROSIS TYPE: ICD-10-CM

## 2024-07-11 DIAGNOSIS — J44.9 CHRONIC OBSTRUCTIVE PULMONARY DISEASE, UNSPECIFIED COPD TYPE (MULTI): ICD-10-CM

## 2024-07-11 DIAGNOSIS — R53.1 WEAKNESS: ICD-10-CM

## 2024-07-11 PROCEDURE — 99309 SBSQ NF CARE MODERATE MDM 30: CPT | Performed by: NURSE PRACTITIONER

## 2024-07-11 NOTE — LETTER
Patient: Kael Barber  : 1940    Encounter Date: 2024    PROGRESS NOTE    Subjective  Chief complaint: Kael Barber is a 84 y.o. male who is an acute skilled patient being seen and evaluated for weakness    HPI: reports that he is making progress in therapy. Has nephrostomy tube - hematuria has resolved. Actively draining.   Denies any chest pain or tightness. Has good appetite.   HPI      Objective  Vital signs: 118/64-77-18-98.3     Physical Exam  Vitals and nursing note reviewed.   Constitutional:       General: He is not in acute distress.  Eyes:      Extraocular Movements: Extraocular movements intact.   Cardiovascular:      Rate and Rhythm: Normal rate and regular rhythm.   Pulmonary:      Effort: Pulmonary effort is normal.      Breath sounds: Normal breath sounds.      Comments: Lungs clear   Abdominal:      General: Bowel sounds are normal.      Palpations: Abdomen is soft.   Genitourinary:     Comments: Left nephrostomy tube - no hematuria. Dressing is dry and intact  No drainage. No inflammation     Angel catheter - CD bag- pale yellow urine   Musculoskeletal:      Cervical back: Neck supple.      Right lower leg: No edema.      Left lower leg: No edema.   Neurological:      Mental Status: He is alert.   Psychiatric:         Mood and Affect: Mood normal.         Behavior: Behavior is cooperative.         Assessment/Plan  Problem List Items Addressed This Visit       Atrial fibrillation (Multi) - Primary     Eliquis  Bleeding precautions  Metoprolol         COPD (chronic obstructive pulmonary disease) (Multi)     Monitor for shortness of breath  Elevate head of bed  Inhalers         GERD (gastroesophageal reflux disease)     PPI  Monitor GI symptoms         Hypertensive heart disease with CHF (congestive heart failure) (Multi)     Monitor blood pressure  Spironolactone   CHF: Monitor for shortness of breath, weight gain  Entresto  Metoprolol         BPH (benign prostatic hyperplasia)      Tamsulosin  Monitor for increased  symptoms         Type 2 diabetes mellitus (Multi)     Monitor fasting blood sugar         Weakness     Continue working in therapy         Deep vein thrombosis, upper right extremity (Multi)     Remains on eliquis  Bleeding precautions         Hydronephrosis     Status post left nephrostomy tube placement.  F/u outpatient for stent exchange in 6 weeks  Hematuria evident - nephrostomy -to send urine culture   Stress need to prevent any trauma to nephrostomy tube           Medications, treatments, and labs reviewed  Continue medications and treatments as listed in EMR    GILMA So      Electronically Signed By: GILMA So   7/13/24  3:18 PM

## 2024-07-12 ENCOUNTER — NURSING HOME VISIT (OUTPATIENT)
Dept: POST ACUTE CARE | Facility: EXTERNAL LOCATION | Age: 84
End: 2024-07-12
Payer: MEDICARE

## 2024-07-12 DIAGNOSIS — R53.1 WEAKNESS: ICD-10-CM

## 2024-07-12 DIAGNOSIS — A49.8 INFECTION DUE TO ESBL-PRODUCING ESCHERICHIA COLI: Primary | ICD-10-CM

## 2024-07-12 DIAGNOSIS — K21.00 GASTROESOPHAGEAL REFLUX DISEASE WITH ESOPHAGITIS, UNSPECIFIED WHETHER HEMORRHAGE: ICD-10-CM

## 2024-07-12 DIAGNOSIS — I48.91 ATRIAL FIBRILLATION, UNSPECIFIED TYPE (MULTI): ICD-10-CM

## 2024-07-12 DIAGNOSIS — J44.9 CHRONIC OBSTRUCTIVE PULMONARY DISEASE, UNSPECIFIED COPD TYPE (MULTI): ICD-10-CM

## 2024-07-12 DIAGNOSIS — E11.69 TYPE 2 DIABETES MELLITUS WITH OTHER SPECIFIED COMPLICATION, UNSPECIFIED WHETHER LONG TERM INSULIN USE (MULTI): ICD-10-CM

## 2024-07-12 DIAGNOSIS — Z16.12 INFECTION DUE TO ESBL-PRODUCING ESCHERICHIA COLI: Primary | ICD-10-CM

## 2024-07-12 DIAGNOSIS — I11.0 HYPERTENSIVE HEART DISEASE WITH CONGESTIVE HEART FAILURE, UNSPECIFIED HEART FAILURE TYPE (MULTI): ICD-10-CM

## 2024-07-12 DIAGNOSIS — N40.0 BENIGN PROSTATIC HYPERPLASIA, UNSPECIFIED WHETHER LOWER URINARY TRACT SYMPTOMS PRESENT: ICD-10-CM

## 2024-07-12 PROCEDURE — 99309 SBSQ NF CARE MODERATE MDM 30: CPT | Performed by: INTERNAL MEDICINE

## 2024-07-12 NOTE — PROGRESS NOTES
PROGRESS NOTE    Subjective   Chief complaint: Kael Barber is a 84 y.o. male who is an acute skilled patient being seen and evaluated for weakness    HPI: reports that he is making progress in therapy. Has nephrostomy tube - hematuria has resolved. Actively draining.   Denies any chest pain or tightness. Has good appetite.   HPI      Objective   Vital signs: 118/64-77-18-98.3     Physical Exam  Vitals and nursing note reviewed.   Constitutional:       General: He is not in acute distress.  Eyes:      Extraocular Movements: Extraocular movements intact.   Cardiovascular:      Rate and Rhythm: Normal rate and regular rhythm.   Pulmonary:      Effort: Pulmonary effort is normal.      Breath sounds: Normal breath sounds.      Comments: Lungs clear   Abdominal:      General: Bowel sounds are normal.      Palpations: Abdomen is soft.   Genitourinary:     Comments: Left nephrostomy tube - no hematuria. Dressing is dry and intact  No drainage. No inflammation     Angel catheter - CD bag- pale yellow urine   Musculoskeletal:      Cervical back: Neck supple.      Right lower leg: No edema.      Left lower leg: No edema.   Neurological:      Mental Status: He is alert.   Psychiatric:         Mood and Affect: Mood normal.         Behavior: Behavior is cooperative.         Assessment/Plan   Problem List Items Addressed This Visit       Atrial fibrillation (Multi) - Primary     Eliquis  Bleeding precautions  Metoprolol         COPD (chronic obstructive pulmonary disease) (Multi)     Monitor for shortness of breath  Elevate head of bed  Inhalers         GERD (gastroesophageal reflux disease)     PPI  Monitor GI symptoms         Hypertensive heart disease with CHF (congestive heart failure) (Multi)     Monitor blood pressure  Spironolactone   CHF: Monitor for shortness of breath, weight gain  Entresto  Metoprolol         BPH (benign prostatic hyperplasia)     Tamsulosin  Monitor for increased  symptoms         Type 2 diabetes  mellitus (Multi)     Monitor fasting blood sugar         Weakness     Continue working in therapy         Deep vein thrombosis, upper right extremity (Multi)     Remains on eliquis  Bleeding precautions         Hydronephrosis     Status post left nephrostomy tube placement.  F/u outpatient for stent exchange in 6 weeks  Hematuria evident - nephrostomy -to send urine culture   Stress need to prevent any trauma to nephrostomy tube           Medications, treatments, and labs reviewed  Continue medications and treatments as listed in EMR    Luz Hicks, APRN-CNP

## 2024-07-12 NOTE — LETTER
Patient: Kael Barber  : 1940    Encounter Date: 2024    PROGRESS NOTE    Subjective  Chief complaint: Kael Barber is a 84 y.o. male who is an acute skilled patient being seen and evaluated for weakness    HPI:  HPI  Patient presents for general medical care and f/u.  Patient seen and examined at bedside.  No issues per nursing.  Patient has no acute complaints.  Patient has recently had IV antibiotics.  Orders placed to remove PICC line.  Patient does continue to work in therapy due to generalized weakness.  HTN BP at goal.  Denies chest pain and headache.  CHF stable, denies sob, orthopnea, weight gain.  DM, denies polydipsia polyuria polyphagia.  COPD stable, denies sob, wheezing, cough.  AFIB stable, denies palpitations and chest pain.  GERD controlled.  Denies heartburn, regurgitation, epigastric discomfort, sour taste, and cough.  Mentation at baseline, no acute distress.    Objective  Vital signs: 132/65, 78    Physical Exam  Constitutional:       General: He is not in acute distress.  Eyes:      Extraocular Movements: Extraocular movements intact.   Cardiovascular:      Rate and Rhythm: Normal rate and regular rhythm.   Pulmonary:      Effort: Pulmonary effort is normal.      Breath sounds: Normal breath sounds.   Abdominal:      General: Bowel sounds are normal.      Palpations: Abdomen is soft.   Genitourinary:     Comments: Left nephrostomy tube  Musculoskeletal:      Cervical back: Neck supple.      Right lower leg: No edema.      Left lower leg: No edema.   Neurological:      Mental Status: He is alert.      Motor: Weakness present.   Psychiatric:         Mood and Affect: Mood normal.         Behavior: Behavior is cooperative.         Assessment/Plan  Problem List Items Addressed This Visit       Atrial fibrillation (Multi)     Eliquis  Bleeding precautions  Metoprolol         COPD (chronic obstructive pulmonary disease) (Multi)     Monitor for shortness of breath  Elevate head of  bed  Inhalers         GERD (gastroesophageal reflux disease)     PPI  Monitor GI symptoms         Hypertensive heart disease with CHF (congestive heart failure) (Multi)     Monitor blood pressure  Spironolactone   CHF: Monitor for shortness of breath, weight gain  Entresto  Metoprolol         BPH (benign prostatic hyperplasia)     Tamsulosin  Monitor for increased  symptoms         Type 2 diabetes mellitus (Multi)     Monitor fasting blood sugar         Weakness     Continue working in therapy         Infection due to ESBL-producing Escherichia coli - Primary     Completed antibiotics, remove PICC          Medications, treatments, and labs reviewed  Continue medications and treatments as listed in EMR      Scribe Attestation  I, Víctor Bowenibbobbi   attest that this documentation has been prepared under the direction and in the presence of Ray Aleman MD    Provider Attestation - Scribe documentation  All medical record entries made by the Scribe were at my direction and personally dictated by me. I have reviewed the chart and agree that the record accurately reflects my personal performance of the history, physical exam, discussion and plan.   Ray Aleman MD        Electronically Signed By: Ray Aleman MD   7/12/24  5:45 PM

## 2024-07-12 NOTE — PROGRESS NOTES
PROGRESS NOTE    Subjective   Chief complaint: Kael Barber is a 84 y.o. male who is an acute skilled patient being seen and evaluated for weakness    HPI:  HPI  Patient presents for general medical care and f/u.  Patient seen and examined at bedside.  No issues per nursing.  Patient has no acute complaints.  Patient has recently had IV antibiotics.  Orders placed to remove PICC line.  Patient does continue to work in therapy due to generalized weakness.  HTN BP at goal.  Denies chest pain and headache.  CHF stable, denies sob, orthopnea, weight gain.  DM, denies polydipsia polyuria polyphagia.  COPD stable, denies sob, wheezing, cough.  AFIB stable, denies palpitations and chest pain.  GERD controlled.  Denies heartburn, regurgitation, epigastric discomfort, sour taste, and cough.  Mentation at baseline, no acute distress.    Objective   Vital signs: 132/65, 78    Physical Exam  Constitutional:       General: He is not in acute distress.  Eyes:      Extraocular Movements: Extraocular movements intact.   Cardiovascular:      Rate and Rhythm: Normal rate and regular rhythm.   Pulmonary:      Effort: Pulmonary effort is normal.      Breath sounds: Normal breath sounds.   Abdominal:      General: Bowel sounds are normal.      Palpations: Abdomen is soft.   Genitourinary:     Comments: Left nephrostomy tube  Musculoskeletal:      Cervical back: Neck supple.      Right lower leg: No edema.      Left lower leg: No edema.   Neurological:      Mental Status: He is alert.      Motor: Weakness present.   Psychiatric:         Mood and Affect: Mood normal.         Behavior: Behavior is cooperative.         Assessment/Plan   Problem List Items Addressed This Visit       Atrial fibrillation (Multi)     Eliquis  Bleeding precautions  Metoprolol         COPD (chronic obstructive pulmonary disease) (Multi)     Monitor for shortness of breath  Elevate head of bed  Inhalers         GERD (gastroesophageal reflux disease)     PPI  Monitor  GI symptoms         Hypertensive heart disease with CHF (congestive heart failure) (Multi)     Monitor blood pressure  Spironolactone   CHF: Monitor for shortness of breath, weight gain  Entresto  Metoprolol         BPH (benign prostatic hyperplasia)     Tamsulosin  Monitor for increased  symptoms         Type 2 diabetes mellitus (Multi)     Monitor fasting blood sugar         Weakness     Continue working in therapy         Infection due to ESBL-producing Escherichia coli - Primary     Completed antibiotics, remove PICC          Medications, treatments, and labs reviewed  Continue medications and treatments as listed in EMR      Scribe Attestation  I, Uriel Bowen   attest that this documentation has been prepared under the direction and in the presence of Ray Aleman MD    Provider Attestation - Scribe documentation  All medical record entries made by the Scribe were at my direction and personally dictated by me. I have reviewed the chart and agree that the record accurately reflects my personal performance of the history, physical exam, discussion and plan.   Ray Aleman MD

## 2024-07-13 NOTE — ASSESSMENT & PLAN NOTE
Status post left nephrostomy tube placement.  F/u outpatient for stent exchange in 6 weeks  Hematuria evident - nephrostomy -to send urine culture   Stress need to prevent any trauma to nephrostomy tube

## 2024-07-16 ENCOUNTER — NURSING HOME VISIT (OUTPATIENT)
Dept: POST ACUTE CARE | Facility: EXTERNAL LOCATION | Age: 84
End: 2024-07-16
Payer: MEDICARE

## 2024-07-16 DIAGNOSIS — K21.00 GASTROESOPHAGEAL REFLUX DISEASE WITH ESOPHAGITIS, UNSPECIFIED WHETHER HEMORRHAGE: ICD-10-CM

## 2024-07-16 DIAGNOSIS — I48.91 ATRIAL FIBRILLATION, UNSPECIFIED TYPE (MULTI): ICD-10-CM

## 2024-07-16 DIAGNOSIS — R33.9 URINARY RETENTION: ICD-10-CM

## 2024-07-16 DIAGNOSIS — N40.0 BENIGN PROSTATIC HYPERPLASIA, UNSPECIFIED WHETHER LOWER URINARY TRACT SYMPTOMS PRESENT: Primary | ICD-10-CM

## 2024-07-16 DIAGNOSIS — N13.30 HYDRONEPHROSIS, UNSPECIFIED HYDRONEPHROSIS TYPE: ICD-10-CM

## 2024-07-16 DIAGNOSIS — I11.0 HYPERTENSIVE HEART DISEASE WITH CONGESTIVE HEART FAILURE, UNSPECIFIED HEART FAILURE TYPE (MULTI): ICD-10-CM

## 2024-07-16 DIAGNOSIS — I82.621 DEEP VEIN THROMBOSIS (DVT) OF RIGHT UPPER EXTREMITY, UNSPECIFIED CHRONICITY, UNSPECIFIED VEIN (MULTI): ICD-10-CM

## 2024-07-16 DIAGNOSIS — R53.1 WEAKNESS: ICD-10-CM

## 2024-07-16 DIAGNOSIS — E11.69 TYPE 2 DIABETES MELLITUS WITH OTHER SPECIFIED COMPLICATION, UNSPECIFIED WHETHER LONG TERM INSULIN USE (MULTI): ICD-10-CM

## 2024-07-16 DIAGNOSIS — J44.9 CHRONIC OBSTRUCTIVE PULMONARY DISEASE, UNSPECIFIED COPD TYPE (MULTI): ICD-10-CM

## 2024-07-16 PROCEDURE — 99309 SBSQ NF CARE MODERATE MDM 30: CPT | Performed by: NURSE PRACTITIONER

## 2024-07-16 NOTE — LETTER
Patient: Kael Barber  : 1940    Encounter Date: 2024    PROGRESS NOTE    Subjective  Chief complaint: Kael Barber is a 84 y.o. male who is an acute skilled patient being seen and evaluated for weakness    HPI: is currently in therapy. The therapist reports that his strength has improved and he is walking greater distances.  Denies any chest pain or tightness.    Nephrostomy tube functional - clear yellow urine, no hematuria.   HPI      Objective  Vital signs: 128/64-77-18-98.4     Physical Exam  Vitals and nursing note reviewed.   Constitutional:       General: He is not in acute distress.  Eyes:      Extraocular Movements: Extraocular movements intact.   Cardiovascular:      Rate and Rhythm: Normal rate and regular rhythm.   Pulmonary:      Effort: Pulmonary effort is normal.      Breath sounds: Normal breath sounds.      Comments: Lungs clear   Abdominal:      General: Bowel sounds are normal.      Palpations: Abdomen is soft.   Genitourinary:     Comments: Left nephrostomy tube - no hematuria. Dressing is dry and intact  No drainage. No inflammation     Angel catheter - CD bag- pale yellow urine   Musculoskeletal:      Cervical back: Neck supple.      Right lower leg: No edema.      Left lower leg: No edema.   Neurological:      Mental Status: He is alert.   Psychiatric:         Mood and Affect: Mood normal.         Behavior: Behavior is cooperative.         Assessment/Plan  Problem List Items Addressed This Visit       Atrial fibrillation (Multi)     Eliquis  Bleeding precautions  Metoprolol         COPD (chronic obstructive pulmonary disease) (Multi)     Monitor for shortness of breath  Elevate head of bed  Inhalers         GERD (gastroesophageal reflux disease)     PPI  Monitor GI symptoms         Hypertensive heart disease with CHF (congestive heart failure) (Multi)     Monitor blood pressure  Spironolactone   CHF: Monitor for shortness of breath, weight gain  Entresto  Metoprolol         BPH  (benign prostatic hyperplasia) - Primary     Tamsulosin  Monitor for increased  symptoms         Urinary retention     Tamsulosin  Monitor symptoms  Requires bruno catheter          Type 2 diabetes mellitus (Multi)     Monitor fasting blood sugar         Weakness     Continue therapy, work towards goals  Therapist reports that he is making good progress         Deep vein thrombosis, upper right extremity (Multi)     Line Associated  Loaded with Eliquis in hospital.  Remains on anticoagulant  Bleeding precautions         Hydronephrosis     Status post left nephrostomy tube placement.  F/u outpatient for stent exchange in 6 weeks  Hematuria evident - nephrostomy -to send urine culture   Stress need to prevent any trauma to nephrostomy tube           Medications, treatments, and labs reviewed  Continue medications and treatments as listed in EMR    GILMA So      Electronically Signed By: GILMA So   7/20/24 10:52 AM

## 2024-07-17 ENCOUNTER — NURSING HOME VISIT (OUTPATIENT)
Dept: POST ACUTE CARE | Facility: EXTERNAL LOCATION | Age: 84
End: 2024-07-17
Payer: MEDICARE

## 2024-07-17 DIAGNOSIS — J44.9 CHRONIC OBSTRUCTIVE PULMONARY DISEASE, UNSPECIFIED COPD TYPE (MULTI): ICD-10-CM

## 2024-07-17 DIAGNOSIS — R53.1 WEAKNESS: ICD-10-CM

## 2024-07-17 DIAGNOSIS — I11.0 HYPERTENSIVE HEART DISEASE WITH CONGESTIVE HEART FAILURE, UNSPECIFIED HEART FAILURE TYPE (MULTI): ICD-10-CM

## 2024-07-17 DIAGNOSIS — I82.621 DEEP VEIN THROMBOSIS (DVT) OF RIGHT UPPER EXTREMITY, UNSPECIFIED CHRONICITY, UNSPECIFIED VEIN (MULTI): Primary | ICD-10-CM

## 2024-07-17 PROCEDURE — 99308 SBSQ NF CARE LOW MDM 20: CPT | Performed by: INTERNAL MEDICINE

## 2024-07-17 NOTE — PROGRESS NOTES
PROGRESS NOTE    Subjective   Chief complaint: Kael Barber is a 84 y.o. male who is an acute skilled patient being seen and evaluated for weakness    HPI:  HPI  An interactive audio and/or video telecommunication system which permits real time communications between the patient (at the originating site) and provider (at a distant site) was utilized to provide this telehealth service after obtaining verbal consent.    Patient is currently working in therapy due to generalized weakness.  Patient is completing ADL tasks for upper and lower body bathing and dressing with min to mod assist.  Patient appears to be in no acute distress.  Nursing staff voicing no new concerns.  Patient denies constitutional symptoms.  Objective   Vital signs: 128/62, 72    Physical Exam  Constitutional:       General: He is not in acute distress.  Eyes:      Extraocular Movements: Extraocular movements intact.   Pulmonary:      Effort: Pulmonary effort is normal.   Musculoskeletal:      Cervical back: Neck supple.   Neurological:      Mental Status: He is alert.   Psychiatric:         Mood and Affect: Mood normal.         Behavior: Behavior is cooperative.         Assessment/Plan   Problem List Items Addressed This Visit       COPD (chronic obstructive pulmonary disease) (Multi)     Monitor for shortness of breath  Elevate head of bed  Inhalers         Hypertensive heart disease with CHF (congestive heart failure) (Multi)     Monitor blood pressure  Spironolactone   CHF: Monitor for shortness of breath, weight gain  Entresto  Metoprolol         Weakness     Continue therapy, work towards goals         Deep vein thrombosis, upper right extremity (Multi) - Primary     Line Associated  Loaded with Eliquis in hospital.  Remains on anticoagulant  Bleeding precautions          Medications, treatments, and labs reviewed  Continue medications and treatments as listed in EMR      Víctoribe Attestation  CARLOS, Uriel Bowen   attest that this  documentation has been prepared under the direction and in the presence of Ray Aleman MD    Provider Attestation - Scribe documentation  All medical record entries made by the Scribe were at my direction and personally dictated by me. I have reviewed the chart and agree that the record accurately reflects my personal performance of the history, physical exam, discussion and plan.   Ray Aleman MD

## 2024-07-17 NOTE — LETTER
Patient: Kael Barber  : 1940    Encounter Date: 2024    PROGRESS NOTE    Subjective  Chief complaint: Kael Barber is a 84 y.o. male who is an acute skilled patient being seen and evaluated for weakness    HPI:  HPI  An interactive audio and/or video telecommunication system which permits real time communications between the patient (at the originating site) and provider (at a distant site) was utilized to provide this telehealth service after obtaining verbal consent.    Patient is currently working in therapy due to generalized weakness.  Patient is completing ADL tasks for upper and lower body bathing and dressing with min to mod assist.  Patient appears to be in no acute distress.  Nursing staff voicing no new concerns.  Patient denies constitutional symptoms.  Objective  Vital signs: 128/62, 72    Physical Exam  Constitutional:       General: He is not in acute distress.  Eyes:      Extraocular Movements: Extraocular movements intact.   Pulmonary:      Effort: Pulmonary effort is normal.   Musculoskeletal:      Cervical back: Neck supple.   Neurological:      Mental Status: He is alert.   Psychiatric:         Mood and Affect: Mood normal.         Behavior: Behavior is cooperative.         Assessment/Plan  Problem List Items Addressed This Visit       COPD (chronic obstructive pulmonary disease) (Multi)     Monitor for shortness of breath  Elevate head of bed  Inhalers         Hypertensive heart disease with CHF (congestive heart failure) (Multi)     Monitor blood pressure  Spironolactone   CHF: Monitor for shortness of breath, weight gain  Entresto  Metoprolol         Weakness     Continue therapy, work towards goals         Deep vein thrombosis, upper right extremity (Multi) - Primary     Line Associated  Loaded with Eliquis in hospital.  Remains on anticoagulant  Bleeding precautions          Medications, treatments, and labs reviewed  Continue medications and treatments as listed in EMR      Scribe  Attestation  I, Uriel Bowen   attest that this documentation has been prepared under the direction and in the presence of Ray Aleman MD    Provider Attestation - Scribe documentation  All medical record entries made by the Scribe were at my direction and personally dictated by me. I have reviewed the chart and agree that the record accurately reflects my personal performance of the history, physical exam, discussion and plan.   Ray Aleman MD        Electronically Signed By: Ray Aleman MD   7/17/24  3:19 PM   Detail Level: Generalized Detail Level: Zone Detail Level: Simple

## 2024-07-17 NOTE — ASSESSMENT & PLAN NOTE
Appointment changed to [facility].   Monitor blood pressure  Spironolactone   CHF: Monitor for shortness of breath, weight gain  Entresto  Metoprolol

## 2024-07-17 NOTE — PROGRESS NOTES
PROGRESS NOTE    Subjective   Chief complaint: Kael Barber is a 84 y.o. male who is an acute skilled patient being seen and evaluated for weakness    HPI: is currently in therapy. The therapist reports that his strength has improved and he is walking greater distances.  Denies any chest pain or tightness.    Nephrostomy tube functional - clear yellow urine, no hematuria.   HPI      Objective   Vital signs: 128/64-77-18-98.4     Physical Exam  Vitals and nursing note reviewed.   Constitutional:       General: He is not in acute distress.  Eyes:      Extraocular Movements: Extraocular movements intact.   Cardiovascular:      Rate and Rhythm: Normal rate and regular rhythm.   Pulmonary:      Effort: Pulmonary effort is normal.      Breath sounds: Normal breath sounds.      Comments: Lungs clear   Abdominal:      General: Bowel sounds are normal.      Palpations: Abdomen is soft.   Genitourinary:     Comments: Left nephrostomy tube - no hematuria. Dressing is dry and intact  No drainage. No inflammation     Angel catheter - CD bag- pale yellow urine   Musculoskeletal:      Cervical back: Neck supple.      Right lower leg: No edema.      Left lower leg: No edema.   Neurological:      Mental Status: He is alert.   Psychiatric:         Mood and Affect: Mood normal.         Behavior: Behavior is cooperative.         Assessment/Plan   Problem List Items Addressed This Visit       Atrial fibrillation (Multi)     Eliquis  Bleeding precautions  Metoprolol         COPD (chronic obstructive pulmonary disease) (Multi)     Monitor for shortness of breath  Elevate head of bed  Inhalers         GERD (gastroesophageal reflux disease)     PPI  Monitor GI symptoms         Hypertensive heart disease with CHF (congestive heart failure) (Multi)     Monitor blood pressure  Spironolactone   CHF: Monitor for shortness of breath, weight gain  Entresto  Metoprolol         BPH (benign prostatic hyperplasia) - Primary     Tamsulosin  Monitor for  increased  symptoms         Urinary retention     Tamsulosin  Monitor symptoms  Requires bruno catheter          Type 2 diabetes mellitus (Multi)     Monitor fasting blood sugar         Weakness     Continue therapy, work towards goals  Therapist reports that he is making good progress         Deep vein thrombosis, upper right extremity (Multi)     Line Associated  Loaded with Eliquis in hospital.  Remains on anticoagulant  Bleeding precautions         Hydronephrosis     Status post left nephrostomy tube placement.  F/u outpatient for stent exchange in 6 weeks  Hematuria evident - nephrostomy -to send urine culture   Stress need to prevent any trauma to nephrostomy tube           Medications, treatments, and labs reviewed  Continue medications and treatments as listed in EMR    Luz Hicks, APRN-CNP

## 2024-07-18 ENCOUNTER — NURSING HOME VISIT (OUTPATIENT)
Dept: POST ACUTE CARE | Facility: EXTERNAL LOCATION | Age: 84
End: 2024-07-18
Payer: MEDICARE

## 2024-07-18 DIAGNOSIS — N13.30 HYDRONEPHROSIS, UNSPECIFIED HYDRONEPHROSIS TYPE: ICD-10-CM

## 2024-07-18 DIAGNOSIS — E11.69 TYPE 2 DIABETES MELLITUS WITH OTHER SPECIFIED COMPLICATION, UNSPECIFIED WHETHER LONG TERM INSULIN USE (MULTI): ICD-10-CM

## 2024-07-18 DIAGNOSIS — A49.8 INFECTION DUE TO ESBL-PRODUCING ESCHERICHIA COLI: ICD-10-CM

## 2024-07-18 DIAGNOSIS — Z16.12 INFECTION DUE TO ESBL-PRODUCING ESCHERICHIA COLI: ICD-10-CM

## 2024-07-18 DIAGNOSIS — I82.621 DEEP VEIN THROMBOSIS (DVT) OF RIGHT UPPER EXTREMITY, UNSPECIFIED CHRONICITY, UNSPECIFIED VEIN (MULTI): ICD-10-CM

## 2024-07-18 DIAGNOSIS — R33.9 URINARY RETENTION: ICD-10-CM

## 2024-07-18 DIAGNOSIS — J44.9 CHRONIC OBSTRUCTIVE PULMONARY DISEASE, UNSPECIFIED COPD TYPE (MULTI): ICD-10-CM

## 2024-07-18 DIAGNOSIS — K21.00 GASTROESOPHAGEAL REFLUX DISEASE WITH ESOPHAGITIS, UNSPECIFIED WHETHER HEMORRHAGE: ICD-10-CM

## 2024-07-18 DIAGNOSIS — R53.1 WEAKNESS: ICD-10-CM

## 2024-07-18 DIAGNOSIS — I48.91 ATRIAL FIBRILLATION, UNSPECIFIED TYPE (MULTI): Primary | ICD-10-CM

## 2024-07-18 DIAGNOSIS — N40.0 BENIGN PROSTATIC HYPERPLASIA, UNSPECIFIED WHETHER LOWER URINARY TRACT SYMPTOMS PRESENT: ICD-10-CM

## 2024-07-18 DIAGNOSIS — I11.0 HYPERTENSIVE HEART DISEASE WITH CONGESTIVE HEART FAILURE, UNSPECIFIED HEART FAILURE TYPE (MULTI): ICD-10-CM

## 2024-07-18 PROCEDURE — 99309 SBSQ NF CARE MODERATE MDM 30: CPT | Performed by: NURSE PRACTITIONER

## 2024-07-18 NOTE — LETTER
Patient: Kael Barber  : 1940    Encounter Date: 2024    PROGRESS NOTE    Subjective  Chief complaint: Kael Barber is a 84 y.o. male who is an acute skilled patient being seen and evaluated for weakness    HPI: Pleasant and talkative. Nursing reports that he is scheduled for urology consult next month . He reports that his daughter is coming in this afternoon. Nephrostomy tube draining pale yellow urine. Continues to require bruno catheter. Denies any chest pain or tightness.   HPI      Objective  Vital signs: 118/54-72-18-98.3     Physical Exam  Vitals and nursing note reviewed.   Constitutional:       General: He is not in acute distress.  Eyes:      Extraocular Movements: Extraocular movements intact.   Cardiovascular:      Rate and Rhythm: Normal rate and regular rhythm.   Pulmonary:      Effort: Pulmonary effort is normal.      Breath sounds: Normal breath sounds.      Comments: Lungs clear   Abdominal:      General: Bowel sounds are normal.      Palpations: Abdomen is soft.   Genitourinary:     Comments: Left nephrostomy tube - no hematuria. Dressing is dry and intact  No drainage. No inflammation     Bruno catheter - CD bag- pale yellow urine   Musculoskeletal:      Cervical back: Neck supple.      Right lower leg: No edema.      Left lower leg: No edema.   Neurological:      Mental Status: He is alert.   Psychiatric:         Mood and Affect: Mood normal.         Behavior: Behavior is cooperative.         Assessment/Plan  Problem List Items Addressed This Visit       Atrial fibrillation (Multi) - Primary     Eliquis  Bleeding precautions  Metoprolol         COPD (chronic obstructive pulmonary disease) (Multi)     Monitor for shortness of breath  Elevate head of bed  Inhalers         GERD (gastroesophageal reflux disease)     PPI  Monitor GI symptoms         Hypertensive heart disease with CHF (congestive heart failure) (Multi)     Monitor blood pressure  Spironolactone   CHF: Monitor for shortness of  breath, weight gain  Entresto  Metoprolol         BPH (benign prostatic hyperplasia)     Tamsulosin  Monitor for increased  symptoms  Requires bruno catheter   Is scheduled for follow up with urology          Urinary retention     Tamsulosin  Monitor symptoms  Requires bruno catheter          Type 2 diabetes mellitus (Multi)     Monitor fasting blood sugar         Weakness     Continue therapy, work towards goals  Therapist reports that he is making good progress         Infection due to ESBL-producing Escherichia coli     Completed antibiotics, remove PICC         Deep vein thrombosis, upper right extremity (Multi)     Line Associated  Loaded with Eliquis in hospital.  Remains on anticoagulant  Bleeding precautions         Hydronephrosis     Status post left nephrostomy tube placement.  F/u outpatient for stent exchange in 6 weeks  Hematuria evident - nephrostomy -to send urine culture   Stress need to prevent any trauma to nephrostomy tube           Medications, treatments, and labs reviewed  Continue medications and treatments as listed in EMR    GILMA So      Electronically Signed By: GILMA So   7/20/24  7:30 PM

## 2024-07-19 NOTE — PROGRESS NOTES
PROGRESS NOTE    Subjective   Chief complaint: Kael Barber is a 84 y.o. male who is an acute skilled patient being seen and evaluated for weakness    HPI: Pleasant and talkative. Nursing reports that he is scheduled for urology consult next month . He reports that his daughter is coming in this afternoon. Nephrostomy tube draining pale yellow urine. Continues to require bruno catheter. Denies any chest pain or tightness.   HPI      Objective   Vital signs: 118/[id number]     Physical Exam  Vitals and nursing note reviewed.   Constitutional:       General: He is not in acute distress.  Eyes:      Extraocular Movements: Extraocular movements intact.   Cardiovascular:      Rate and Rhythm: Normal rate and regular rhythm.   Pulmonary:      Effort: Pulmonary effort is normal.      Breath sounds: Normal breath sounds.      Comments: Lungs clear   Abdominal:      General: Bowel sounds are normal.      Palpations: Abdomen is soft.   Genitourinary:     Comments: Left nephrostomy tube - no hematuria. Dressing is dry and intact  No drainage. No inflammation     Bruno catheter - CD bag- pale yellow urine   Musculoskeletal:      Cervical back: Neck supple.      Right lower leg: No edema.      Left lower leg: No edema.   Neurological:      Mental Status: He is alert.   Psychiatric:         Mood and Affect: Mood normal.         Behavior: Behavior is cooperative.         Assessment/Plan   Problem List Items Addressed This Visit       Atrial fibrillation (Multi) - Primary     Eliquis  Bleeding precautions  Metoprolol         COPD (chronic obstructive pulmonary disease) (Multi)     Monitor for shortness of breath  Elevate head of bed  Inhalers         GERD (gastroesophageal reflux disease)     PPI  Monitor GI symptoms         Hypertensive heart disease with CHF (congestive heart failure) (Multi)     Monitor blood pressure  Spironolactone   CHF: Monitor for shortness of breath, weight gain  Entresto  Metoprolol         BPH (benign  prostatic hyperplasia)     Tamsulosin  Monitor for increased  symptoms  Requires bruno catheter   Is scheduled for follow up with urology          Urinary retention     Tamsulosin  Monitor symptoms  Requires bruno catheter          Type 2 diabetes mellitus (Multi)     Monitor fasting blood sugar         Weakness     Continue therapy, work towards goals  Therapist reports that he is making good progress         Infection due to ESBL-producing Escherichia coli     Completed antibiotics, remove PICC         Deep vein thrombosis, upper right extremity (Multi)     Line Associated  Loaded with Eliquis in hospital.  Remains on anticoagulant  Bleeding precautions         Hydronephrosis     Status post left nephrostomy tube placement.  F/u outpatient for stent exchange in 6 weeks  Hematuria evident - nephrostomy -to send urine culture   Stress need to prevent any trauma to nephrostomy tube           Medications, treatments, and labs reviewed  Continue medications and treatments as listed in EMR    Luz Hicks, APRN-CNP

## 2024-07-20 NOTE — ASSESSMENT & PLAN NOTE
Tamsulosin  Monitor for increased  symptoms  Requires bruno catheter   Is scheduled for follow up with urology